# Patient Record
Sex: FEMALE | Race: WHITE | NOT HISPANIC OR LATINO | Employment: FULL TIME | URBAN - METROPOLITAN AREA
[De-identification: names, ages, dates, MRNs, and addresses within clinical notes are randomized per-mention and may not be internally consistent; named-entity substitution may affect disease eponyms.]

---

## 2017-02-16 ENCOUNTER — TELEPHONE (OUTPATIENT)
Dept: FAMILY MEDICINE | Facility: CLINIC | Age: 52
End: 2017-02-16

## 2017-02-16 NOTE — TELEPHONE ENCOUNTER
Panel Management Review      Patient has the following on her problem list: None      Composite cancer screening  Chart review shows that this patient is due/due soon for the following Mammogram and Colonoscopy  Summary:    Patient is due/failing the following:   COLONOSCOPY and MAMMOGRAM    Action needed:   Patient needs referral/order: mammo and colon    Type of outreach:    Sent [employer] message. and Sent letter.    Questions for provider review:    None                                                                                   Mary Hummel, DEMETRIO

## 2017-02-16 NOTE — LETTER
[facility]  [address] [place]  [address]  Phone: [phone]      February 16, 2017      Aimee Reff  [address]  [facility] [address]      Dear Aimee,    In order to ensure we are providing the best quality care, Dr. Argueta and I have reviewed your chart and see that you are due for a Mammogram and Colonoscopy.    Please call one of the following numbers to schedule your mammogram:  [facility] [phone]  [facility] [phone]  [facility] [phone]  [facility] [phone]    Please call one of the following numbers to schedule a colonoscopy:  [facility] [phone]  [facility] [phone]  [facility] [phone] (multiple sites, call for locations)    Colon cancer screening is recommended starting at the age of 50 for the general    population.  We have noticed that you have not yet had your colonoscopy.  We recognize that this procedure can be time consuming and sometimes uncomfortable. However, colonoscopy is the gold standard for colon cancer screening and may be performed as infrequently as every 10 years as long as the colon appears healthy.  Colonoscopy enables the physician to detect and remove precancerous polyps and identify early cancers during a single examination.   Since colorectal cancer is the second leading cause of cancer related deaths in the [place] we strongly recommend screening for this disease.    If you are reluctant to undergo a colonoscopy, there is a less invasive and less expensive alternative.  FIT (fecal immunochemical testing) is a screening option that is performed on a yearly basis.  This is a test to check for blood in the stool, which can be performed in the comfort of your own home.  When you have completed the test, you simply mail it in the pre-addressed envelope.  Please call us at [phone] if you have questions or need the FIT testing mailed to your home.    We greatly appreciate the  opportunity to serve you. Thank you for trusting us with your health care.    Sincerely,    DEMETRIO Streeter M.D.

## 2017-10-02 ENCOUNTER — HOSPITAL ENCOUNTER (OUTPATIENT)
Dept: MAMMOGRAPHY | Facility: CLINIC | Age: 52
Discharge: HOME | End: 2017-10-02
Attending: FAMILY MEDICINE | Admitting: FAMILY MEDICINE
Payer: COMMERCIAL

## 2017-10-02 DIAGNOSIS — Z12.31 VISIT FOR SCREENING MAMMOGRAM: ICD-10-CM

## 2017-10-02 PROCEDURE — G0202 SCR MAMMO BI INCL CAD: HCPCS

## 2017-10-11 ENCOUNTER — RADIANT APPOINTMENT (OUTPATIENT)
Dept: MAMMOGRAPHY | Facility: CLINIC | Age: 52
End: 2017-10-11
Attending: FAMILY MEDICINE
Payer: COMMERCIAL

## 2017-10-11 DIAGNOSIS — R92.8 ABNORMAL MAMMOGRAM: ICD-10-CM

## 2017-10-11 PROCEDURE — G0206 DX MAMMO INCL CAD UNI: HCPCS | Mod: LT

## 2017-10-24 ENCOUNTER — RADIANT APPOINTMENT (OUTPATIENT)
Dept: MAMMOGRAPHY | Facility: CLINIC | Age: 52
End: 2017-10-24
Attending: FAMILY MEDICINE
Payer: COMMERCIAL

## 2017-10-24 ENCOUNTER — RADIANT APPOINTMENT (OUTPATIENT)
Dept: CT IMAGING | Facility: CLINIC | Age: 52
End: 2017-10-24
Attending: FAMILY MEDICINE
Payer: COMMERCIAL

## 2017-10-24 DIAGNOSIS — R92.1 BREAST CALCIFICATIONS: ICD-10-CM

## 2017-10-24 DIAGNOSIS — R92.1 BREAST CALCIFICATIONS: Primary | ICD-10-CM

## 2017-10-24 PROCEDURE — 88305 TISSUE EXAM BY PATHOLOGIST: CPT | Performed by: FAMILY MEDICINE

## 2017-10-24 PROCEDURE — G0204 DX MAMMO INCL CAD BI: HCPCS | Performed by: STUDENT IN AN ORGANIZED HEALTH CARE EDUCATION/TRAINING PROGRAM

## 2017-10-24 PROCEDURE — 19081 BX BREAST 1ST LESION STRTCTC: CPT | Mod: LT | Performed by: STUDENT IN AN ORGANIZED HEALTH CARE EDUCATION/TRAINING PROGRAM

## 2017-10-24 RX ORDER — IOPAMIDOL 755 MG/ML
77 INJECTION, SOLUTION INTRAVASCULAR ONCE
Status: COMPLETED | OUTPATIENT
Start: 2017-10-24 | End: 2017-10-24

## 2017-10-24 RX ORDER — LIDOCAINE HYDROCHLORIDE AND EPINEPHRINE 10; 10 MG/ML; UG/ML
16 INJECTION, SOLUTION INFILTRATION; PERINEURAL ONCE
Status: COMPLETED | OUTPATIENT
Start: 2017-10-24 | End: 2017-10-24

## 2017-10-24 RX ADMIN — Medication 1 MEQ: at 15:18

## 2017-10-24 RX ADMIN — LIDOCAINE HYDROCHLORIDE AND EPINEPHRINE 16 ML: 10; 10 INJECTION, SOLUTION INFILTRATION; PERINEURAL at 15:21

## 2017-10-24 RX ADMIN — IOPAMIDOL 77 ML: 755 INJECTION, SOLUTION INTRAVASCULAR at 14:53

## 2017-10-27 ENCOUNTER — TELEPHONE (OUTPATIENT)
Dept: GENERAL RADIOLOGY | Facility: CLINIC | Age: 52
End: 2017-10-27

## 2017-10-27 LAB — COPATH REPORT: NORMAL

## 2017-10-27 NOTE — TELEPHONE ENCOUNTER
Spoke to Aimee regarding her benign breast biopsy results and the Radiologist's recommendation of annual mammography. Aimee verbalized understanding and all questions were addressed at this time.

## 2018-09-04 ENCOUNTER — TELEPHONE (OUTPATIENT)
Dept: FAMILY MEDICINE | Facility: CLINIC | Age: 53
End: 2018-09-04

## 2018-09-04 NOTE — TELEPHONE ENCOUNTER
Reason for call:  Patient reporting a symptom    Symptom or request: Amiee has been having flu sx., fever, chills, cough, not sleeping at night, congestion and ear pain.  She had an appointment with PA today, but wondering if she really needs to come in.  Please call and assess. Thank you..Corin Ricardo    Duration (how long have symptoms been present): unknown    Have you been treated for this before? No    Phone Number patient can be reached at:  Home number on file [phone] (home)    Best Time:  Any time    Can we leave a detailed message on this number:  YES    Call taken on 9/4/2018 at 9:03 AM by Corin Ricardo

## 2018-09-04 NOTE — TELEPHONE ENCOUNTER
"Patient does not have a thermometer so does not know what her temp is. Taking dayquil and nyquil. Coughing up phlegm--worse when laying down. Symptoms have been since 9/2/18. \"I cough so hard that I cannot catch my breath; but then I am OK.\" \"Throat hurts because of my coughing.\" \"Both ears hurt when I swallow.\" Patient denies wheezing but she is not sure if her chest is congested or not. \"I think that they might be.\" Advised cool mist vaporizor, warm steamy shower, hot tea with lemon juice and honey, chicken noodle soup, push water, rest--elevate head with pillows or sleep in recliner, nasal marely rinse. Appointment made for tomorrow in case it is needed if lungs feel congested. Patient will cancel if she feels like lungs are better.   Edvin Mtz, RN    "

## 2018-09-11 ENCOUNTER — TELEPHONE (OUTPATIENT)
Dept: FAMILY MEDICINE | Facility: CLINIC | Age: 53
End: 2018-09-11

## 2018-09-11 ENCOUNTER — OFFICE VISIT (OUTPATIENT)
Dept: FAMILY MEDICINE | Facility: CLINIC | Age: 53
End: 2018-09-11
Payer: COMMERCIAL

## 2018-09-11 VITALS
WEIGHT: 128 LBS | SYSTOLIC BLOOD PRESSURE: 129 MMHG | DIASTOLIC BLOOD PRESSURE: 82 MMHG | HEART RATE: 76 BPM | TEMPERATURE: 98.7 F | OXYGEN SATURATION: 100 % | HEIGHT: 69 IN | BODY MASS INDEX: 18.96 KG/M2

## 2018-09-11 DIAGNOSIS — J01.90 ACUTE SINUSITIS WITH SYMPTOMS > 10 DAYS: Primary | ICD-10-CM

## 2018-09-11 PROCEDURE — 99213 OFFICE O/P EST LOW 20 MIN: CPT | Performed by: PHYSICIAN ASSISTANT

## 2018-09-11 RX ORDER — DOXYCYCLINE 100 MG/1
100 CAPSULE ORAL 2 TIMES DAILY
Qty: 20 CAPSULE | Refills: 0 | Status: SHIPPED | OUTPATIENT
Start: 2018-09-11 | End: 2019-06-28

## 2018-09-11 NOTE — TELEPHONE ENCOUNTER
Reason for call:  Patient reporting a symptom    Symptom or request: Aimee called in last week with what she thought were flu sx.  9/4/18.  She is still feeling ill.  It seems like the sx are a bit better, but she is now wheezing and feels like the cold has settled into her chest.  Please call and assess. Thank you..Corin Ricardo    Duration (how long have symptoms been present): at least 10 days    Have you been treated for this before? No    Phone Number patient can be reached at:  Cell number on file:    Telephone Information:   Mobile [phone]       Best Time:  Any time    Can we leave a detailed message on this number:  YES    Call taken on 9/11/2018 at 8:24 AM by Corin Ricardo

## 2018-09-11 NOTE — PROGRESS NOTES
"  SUBJECTIVE:   Aimee Higuera is a 52 year old female who presents to clinic today for the following health issues:      ENT Symptoms             Symptoms: cc Present Absent Comment   Fever/Chills  x  Fever on Tuesday    Fatigue  x     Muscle Aches   x    Eye Irritation  x  Burning    Sneezing   x    Nasal Bonilla/Drg  x     Sinus Pressure/Pain   x    Loss of smell   x    Dental pain   x    Sore Throat   x    Swollen Glands   x    Ear Pain/Fullness  x  Pressure in the ears    Cough  x     Wheeze  x     Chest Pain   x    Shortness of breath  x  With activity    Rash   x    Other   x      Symptom duration:  10 days    Symptom severity:  moderate   Treatments tried:  Dayquil and Nyquil    Contacts:  None       Actually started over 10 days ago but assumed it was a cold or allergies  Just developed allergies this summer so does take a daily allergy medication  Last week was home from work with a fever which last until Wed  Fever broke and she thought she would get better but symptoms are persisting      Problem list and histories reviewed & adjusted, as indicated.  Additional history: as documented    Current Outpatient Prescriptions   Medication Sig Dispense Refill     Cholecalciferol (VITAMIN D PO)        Multiple Vitamins-Minerals (MULTIVITAMIN OR)        Allergies   Allergen Reactions     Amoxicillin      BP Readings from Last 3 Encounters:   09/11/18 129/82   07/20/16 98/71   06/04/15 125/78    Wt Readings from Last 3 Encounters:   09/11/18 128 lb (58.1 kg)   07/20/16 126 lb (57.2 kg)   06/04/15 126 lb (57.2 kg)                    Reviewed and updated as needed this visit by clinical staff       Reviewed and updated as needed this visit by Provider         ROS:  Remainder of ROS obtained and found to be negative other than that which was documented above      OBJECTIVE:     /82  Pulse 76  Temp 98.7  F (37.1  C) (Tympanic)  Ht 5' 8.5\" (1.74 m)  Wt 128 lb (58.1 kg)  SpO2 100%  BMI 19.18 kg/m2  Body mass index " is 19.18 kg/(m^2).  GENERAL: healthy, alert and no distress  EYES: Eyes grossly normal to inspection  HENT: ear canals and TM's normal, nose and mouth without ulcers or lesions  NECK: no adenopathy  RESP: lungs clear to auscultation - no rales, rhonchi or wheezes  CV: regular rates and rhythm, normal S1 S2, no S3 or S4 and no murmur, click or rub    Diagnostic Test Results:  none     ASSESSMENT/PLAN:     (J01.90) Acute sinusitis with symptoms > 10 days  (primary encounter diagnosis)  Comment: allergy to amoxicillin. Treat with doxy. Follow up if not improving  Plan: doxycycline (VIBRAMYCIN) 100 MG capsule                Margareth Mccain PA-C  [facility]

## 2018-09-11 NOTE — TELEPHONE ENCOUNTER
Patient reports wheezing and worsening chest congestion x3days. Afebrile the last 2 days, feels she is not getting better. Appointment made for 120pm today.  Mary LAURENT/GI

## 2019-06-28 ENCOUNTER — OFFICE VISIT (OUTPATIENT)
Dept: FAMILY MEDICINE | Facility: CLINIC | Age: 54
End: 2019-06-28
Payer: COMMERCIAL

## 2019-06-28 VITALS
SYSTOLIC BLOOD PRESSURE: 120 MMHG | WEIGHT: 130 LBS | HEIGHT: 69 IN | BODY MASS INDEX: 19.26 KG/M2 | TEMPERATURE: 98.6 F | DIASTOLIC BLOOD PRESSURE: 68 MMHG

## 2019-06-28 DIAGNOSIS — Z00.00 ROUTINE GENERAL MEDICAL EXAMINATION AT A HEALTH CARE FACILITY: Primary | ICD-10-CM

## 2019-06-28 DIAGNOSIS — Z12.11 SPECIAL SCREENING FOR MALIGNANT NEOPLASMS, COLON: ICD-10-CM

## 2019-06-28 DIAGNOSIS — L30.9 DERMATITIS: ICD-10-CM

## 2019-06-28 DIAGNOSIS — Z12.4 ENCOUNTER FOR SCREENING FOR CERVICAL CANCER: ICD-10-CM

## 2019-06-28 DIAGNOSIS — N95.1 VAGINAL DRYNESS, MENOPAUSAL: ICD-10-CM

## 2019-06-28 PROCEDURE — G0145 SCR C/V CYTO,THINLAYER,RESCR: HCPCS | Performed by: PHYSICIAN ASSISTANT

## 2019-06-28 PROCEDURE — 87624 HPV HI-RISK TYP POOLED RSLT: CPT | Performed by: PHYSICIAN ASSISTANT

## 2019-06-28 PROCEDURE — 99213 OFFICE O/P EST LOW 20 MIN: CPT | Mod: 25 | Performed by: PHYSICIAN ASSISTANT

## 2019-06-28 PROCEDURE — 99396 PREV VISIT EST AGE 40-64: CPT | Performed by: PHYSICIAN ASSISTANT

## 2019-06-28 RX ORDER — ASPIRIN 81 MG/1
81 TABLET, CHEWABLE ORAL DAILY
COMMUNITY

## 2019-06-28 RX ORDER — HYDROXYZINE HYDROCHLORIDE 25 MG/1
25-50 TABLET, FILM COATED ORAL EVERY 6 HOURS PRN
Qty: 60 TABLET | Refills: 1 | Status: SHIPPED | OUTPATIENT
Start: 2019-06-28 | End: 2021-10-27

## 2019-06-28 ASSESSMENT — MIFFLIN-ST. JEOR: SCORE: 1251.12

## 2019-06-28 ASSESSMENT — PAIN SCALES - GENERAL: PAINLEVEL: NO PAIN (0)

## 2019-06-28 NOTE — PROGRESS NOTES
SUBJECTIVE:   CC: Aimee Higuera is an 53 year old woman who presents for preventive health visit.     Healthy Habits:    Do you get at least three servings of calcium containing foods daily (dairy, green leafy vegetables, etc.)? yes    Amount of exercise or daily activities, outside of work: Walking 3 times a week     Problems taking medications regularly No    Medication side effects: No    Have you had an eye exam in the past two years? No, due in September     Do you see a dentist twice per year? yes    Do you have sleep apnea, excessive snoring or daytime drowsiness?no      -Her knees have been bothering her more often lately.   Used to be a runner but knees couldn't handle it well so now mostly walks. Can feel them on some walks. Wondering if she is making them worse  CAn be pretty stiff some mornings but not every mornings  No swelling or redness    -Wanting to get a colonoscopy order.   She has never had one  Her  had colon cancer - doing well     -Wanting to get a new order of Hydroxyzine. She got it prescribed in 2013.   Helps with her menopause symptoms    - sex has been painful. dryness    Today's PHQ-2 Score:   PHQ-2 ( 1999 [employer]) 7/20/2016 10/16/2013   Q1: Little interest or pleasure in doing things 0 0   Q2: Feeling down, depressed or hopeless 0 0   PHQ-2 Score 0 0       Abuse: Current or Past(Physical, Sexual or Emotional)- No  Do you feel safe in your environment? Yes    Social History     Tobacco Use     Smoking status: Never Smoker     Smokeless tobacco: Never Used   Substance Use Topics     Alcohol use: Yes     Comment: Socially     If you drink alcohol do you typically have >3 drinks per day or >7 drinks per week? No                     Reviewed orders with patient.  Reviewed health maintenance and updated orders accordingly - Yes  BP Readings from Last 3 Encounters:   06/28/19 120/68   09/11/18 129/82   07/20/16 98/71    Wt Readings from Last 3 Encounters:   06/28/19 59 kg (130 lb)  "  09/11/18 58.1 kg (128 lb)   07/20/16 57.2 kg (126 lb)                  Current Outpatient Medications   Medication Sig Dispense Refill     aspirin (ASA) 81 MG chewable tablet Take 81 mg by mouth daily       BIOTIN PO        Cholecalciferol (VITAMIN D PO)        Omega-3 Fatty Acids (FISH OIL PO)        Allergies   Allergen Reactions     Amoxicillin        Mammogram Screening: Patient over age 50, mutual decision to screen reflected in health maintenance.    Pertinent mammograms are reviewed under the imaging tab.  History of abnormal Pap smear: NO - age 30-65 PAP every 5 years with negative HPV co-testing recommended  PAP / HPV Latest Ref Rng & Units 7/20/2016 10/16/2013 5/25/2011   PAP - ASC-US(A) NIL NIL   HPV 16 DNA NEG Negative - -   HPV 18 DNA NEG Negative - -   OTHER HR HPV NEG Negative - -     Reviewed and updated as needed this visit by clinical staff  Tobacco  Allergies  Meds  Med Hx  Surg Hx  Fam Hx  Soc Hx        Reviewed and updated as needed this visit by Provider            ROS:  Remainder of ROS obtained and found to be negative other than that which was documented above      OBJECTIVE:   /68   Temp 98.6  F (37  C) (Tympanic)   Ht 1.74 m (5' 8.5\")   Wt 59 kg (130 lb)   BMI 19.48 kg/m    EXAM:  GENERAL: healthy, alert and no distress  EYES: Eyes grossly normal to inspection, PERRL and conjunctivae and sclerae normal  HENT: ear canals and TM's normal, nose and mouth without ulcers or lesions  NECK: no adenopathy, no asymmetry, masses, or scars and thyroid normal to palpation  RESP: lungs clear to auscultation - no rales, rhonchi or wheezes  BREAST: normal without masses, tenderness or nipple discharge and no palpable axillary masses or adenopathy  CV: regular rate and rhythm, normal S1 S2, no S3 or S4, no murmur, click or rub, no peripheral edema and peripheral pulses strong  ABDOMEN: soft, nontender, no hepatosplenomegaly, no masses and bowel sounds normal   (female): normal female " "external genitalia, normal urethral meatus, vaginal mucosa pink, moist, well rugated, and normal cervix/adnexa/uterus without masses or discharge  MS: no gross musculoskeletal defects noted, no edema  SKIN: no suspicious lesions or rashes  NEURO: Normal strength and tone, mentation intact and speech normal  PSYCH: mentation appears normal, affect normal/bright    Diagnostic Test Results:  none     ASSESSMENT/PLAN:   (Z00.00) Routine general medical examination at a health care facility  (primary encounter diagnosis)  Comment:   Plan: Pap imaged thin layer screen with HPV -         recommended age 30 - 65 years (select HPV order        below), HPV High Risk Types DNA Cervical            (Z12.11) Special screening for malignant neoplasms, colon  Comment:   Plan: GASTROENTEROLOGY ADULT REF PROCEDURE ONLY            (Z12.4) Encounter for screening for cervical cancer   Comment:   Plan: Pap imaged thin layer screen with HPV -         recommended age 30 - 65 years (select HPV order        below), HPV High Risk Types DNA Cervical            (L30.9) Dermatitis  Comment:   Plan: hydrOXYzine (ATARAX) 25 MG tablet            (N95.1) Vaginal dryness, menopausal  Comment:   Plan: conjugated estrogens (PREMARIN) 0.625 MG/GM         vaginal cream              COUNSELING:   Reviewed preventive health counseling, as reflected in patient instructions       Regular exercise       Healthy diet/nutrition       Colon cancer screening    Estimated body mass index is 19.48 kg/m  as calculated from the following:    Height as of this encounter: 1.74 m (5' 8.5\").    Weight as of this encounter: 59 kg (130 lb).         reports that she has never smoked. She has never used smokeless tobacco.      Counseling Resources:  ATP IV Guidelines  Pooled Cohorts Equation Calculator  Breast Cancer Risk Calculator  FRAX Risk Assessment  ICSI Preventive Guidelines  Dietary Guidelines for Americans, 2010  USDA's MyPlate  ASA Prophylaxis  Lung CA " Screening    Margareth Mccain PA-C  [facility]

## 2019-06-28 NOTE — PATIENT INSTRUCTIONS
To schedule the mammogram, call [phone]    To schedule the colonoscopy, call [phone]      Preventive Health Recommendations  Female Ages 50 - 64    Yearly exam: See your health care provider every year in order to  o Review health changes.   o Discuss preventive care.    o Review your medicines if your doctor has prescribed any.      Get a Pap test every three years (unless you have an abnormal result and your provider advises testing more often).    If you get Pap tests with HPV test, you only need to test every 5 years, unless you have an abnormal result.     You do not need a Pap test if your uterus was removed (hysterectomy) and you have not had cancer.    You should be tested each year for STDs (sexually transmitted diseases) if you're at risk.     Have a mammogram every 1 to 2 years.    Have a colonoscopy at age 50, or have a yearly FIT test (stool test). These exams screen for colon cancer.      Have a cholesterol test every 5 years, or more often if advised.    Have a diabetes test (fasting glucose) every three years. If you are at risk for diabetes, you should have this test more often.     If you are at risk for osteoporosis (brittle bone disease), think about having a bone density scan (DEXA).    Shots: Get a flu shot each year. Get a tetanus shot every 10 years.    Nutrition:     Eat at least 5 servings of fruits and vegetables each day.    Eat whole-grain bread, whole-wheat pasta and brown rice instead of white grains and rice.    Get adequate Calcium and Vitamin D.     Lifestyle    Exercise at least 150 minutes a week (30 minutes a day, 5 days a week). This will help you control your weight and prevent disease.    Limit alcohol to one drink per day.    No smoking.     Wear sunscreen to prevent skin cancer.     See your dentist every six months for an exam and cleaning.    See your eye doctor every 1 to 2 years.

## 2019-07-03 LAB
COPATH REPORT: NORMAL
PAP: NORMAL

## 2019-07-05 LAB
FINAL DIAGNOSIS: NORMAL
HPV HR 12 DNA CVX QL NAA+PROBE: NEGATIVE
HPV16 DNA SPEC QL NAA+PROBE: NEGATIVE
HPV18 DNA SPEC QL NAA+PROBE: NEGATIVE
SPECIMEN DESCRIPTION: NORMAL
SPECIMEN SOURCE CVX/VAG CYTO: NORMAL

## 2019-11-25 ENCOUNTER — ANESTHESIA EVENT (OUTPATIENT)
Dept: GASTROENTEROLOGY | Facility: CLINIC | Age: 54
End: 2019-11-25

## 2019-11-25 NOTE — ANESTHESIA PREPROCEDURE EVALUATION
"Anesthesia Pre-Procedure Evaluation    Patient: Aimee Higuera   MRN: [id number] : 1965          Preoperative Diagnosis: Special screening for malignant neoplasms, colon [Z12.11]    Procedure(s):  COLONOSCOPY    Past Medical History:   Diagnosis Date     ASCUS of cervix with negative high risk HPV 16 ASCUS pap/neg HR HPV.      Hx of abnormal Pap smear ~    no further details provided     Past Surgical History:   Procedure Laterality Date     LAPAROSCOPIC BIOPSY (GENERIC)  22 years old     RECONSTRUCT BREAST, IMPLANT PROSTHESIS, COMBINED  30 years old    Breast implants       Anesthesia Evaluation     .             ROS/MED HX    ENT/Pulmonary:       Neurologic:       Cardiovascular:         METS/Exercise Tolerance:     Hematologic:         Musculoskeletal:         GI/Hepatic:     (+) Other GI/Hepatic       Renal/Genitourinary:         Endo:         Psychiatric:         Infectious Disease:         Malignancy:         Other:                                 Lab Results   Component Value Date    WBC 4.7 2011    HGB 13.8 2011    HCT 42.1 2011     2011    GLC 85 2016    ALBUMIN 4.7 10/16/2013    PROTTOTAL 7.9 10/16/2013    ALT 37 10/16/2013    AST 24 10/16/2013    ALKPHOS 48 10/16/2013    BILITOTAL 0.5 10/16/2013    TSH 1.72 2016       Preop Vitals  BP Readings from Last 3 Encounters:   19 120/68   18 129/82   16 98/71    Pulse Readings from Last 3 Encounters:   18 76   16 80   06/04/15 97      Resp Readings from Last 3 Encounters:   14 12   10/16/13 18    SpO2 Readings from Last 3 Encounters:   18 100%   06/04/15 100%   14 99%      Temp Readings from Last 1 Encounters:   19 37  C (98.6  F) (Tympanic)    Ht Readings from Last 1 Encounters:   19 1.74 m (5' 8.5\")      Wt Readings from Last 1 Encounters:   19 59 kg (130 lb)    Estimated body mass index is 19.48 kg/m  as calculated from the " "following:    Height as of 6/28/19: 1.74 m (5' 8.5\").    Weight as of 6/28/19: 59 kg (130 lb).       Anesthesia Plan      History & Physical Review      ASA Status:  2 .             Postoperative Care      Consents                 KARI Yuen CRNA  "

## 2019-12-09 ENCOUNTER — HOSPITAL ENCOUNTER (OUTPATIENT)
Facility: CLINIC | Age: 54
Discharge: HOME | End: 2019-12-09
Attending: SURGERY | Admitting: SURGERY
Payer: COMMERCIAL

## 2019-12-09 ENCOUNTER — ANESTHESIA (OUTPATIENT)
Dept: GASTROENTEROLOGY | Facility: CLINIC | Age: 54
End: 2019-12-09

## 2019-12-09 VITALS
HEART RATE: 66 BPM | WEIGHT: 123 LBS | OXYGEN SATURATION: 100 % | DIASTOLIC BLOOD PRESSURE: 64 MMHG | HEIGHT: 69 IN | RESPIRATION RATE: 16 BRPM | BODY MASS INDEX: 18.22 KG/M2 | TEMPERATURE: 96.8 F | SYSTOLIC BLOOD PRESSURE: 99 MMHG

## 2019-12-09 LAB — COLONOSCOPY: NORMAL

## 2019-12-09 PROCEDURE — 99152 MOD SED SAME PHYS/QHP 5/>YRS: CPT | Mod: 59 | Performed by: SURGERY

## 2019-12-09 PROCEDURE — 88305 TISSUE EXAM BY PATHOLOGIST: CPT | Performed by: SURGERY

## 2019-12-09 PROCEDURE — 25800030 ZZH RX IP 258 OP 636: Performed by: SURGERY

## 2019-12-09 PROCEDURE — 25000128 H RX IP 250 OP 636: Performed by: SURGERY

## 2019-12-09 PROCEDURE — 45385 COLONOSCOPY W/LESION REMOVAL: CPT | Mod: PT | Performed by: SURGERY

## 2019-12-09 PROCEDURE — G0500 MOD SEDAT ENDO SERVICE >5YRS: HCPCS

## 2019-12-09 PROCEDURE — 88305 TISSUE EXAM BY PATHOLOGIST: CPT | Mod: 26 | Performed by: SURGERY

## 2019-12-09 RX ORDER — FENTANYL CITRATE 50 UG/ML
INJECTION, SOLUTION INTRAMUSCULAR; INTRAVENOUS PRN
Status: DISCONTINUED | OUTPATIENT
Start: 2019-12-09 | End: 2019-12-09 | Stop reason: HOSPADM

## 2019-12-09 RX ORDER — SODIUM CHLORIDE, SODIUM LACTATE, POTASSIUM CHLORIDE, CALCIUM CHLORIDE 600; 310; 30; 20 MG/100ML; MG/100ML; MG/100ML; MG/100ML
INJECTION, SOLUTION INTRAVENOUS CONTINUOUS
Status: DISCONTINUED | OUTPATIENT
Start: 2019-12-09 | End: 2019-12-09 | Stop reason: HOSPADM

## 2019-12-09 RX ADMIN — SODIUM CHLORIDE, POTASSIUM CHLORIDE, SODIUM LACTATE AND CALCIUM CHLORIDE: 600; 310; 30; 20 INJECTION, SOLUTION INTRAVENOUS at 07:04

## 2019-12-09 ASSESSMENT — MIFFLIN-ST. JEOR: SCORE: 1222.3

## 2019-12-09 NOTE — H&P
[facility]    Pre-Endoscopy History and Physical     Aimee Higuera MRN# [id number]   YOB: 1965 Age: 54 year old     Date of Procedure: 12/9/2019  Primary care provider: Merari Argueta  Type of Endoscopy: Colonoscopy with possible biopsy, possible polypectomy  Reason for Procedure: screening  Type of Anesthesia Anticipated: Conscious Sedation    HPI:    Aimee is a 54 year old female who will be undergoing the above procedure.  1st colonoscopy; no PMH: dx lap; mom breast cancer 62 y.o; no colon cancer    A history and physical has been performed. The patient's medications and allergies have been reviewed. The risks and benefits of the procedure and the sedation options and risks were discussed with the patient.  All questions were answered and informed consent was obtained.      She denies a personal or family history of anesthesia complications or bleeding disorders.     Patient Active Problem List   Diagnosis     IBS (irritable bowel syndrome)     CARDIOVASCULAR SCREENING; LDL GOAL LESS THAN 160     Changing skin lesion        Past Medical History:   Diagnosis Date     ASCUS of cervix with negative high risk HPV 7/20/16 7/20/16 ASCUS pap/neg HR HPV.      Hx of abnormal Pap smear ~2001    no further details provided        Past Surgical History:   Procedure Laterality Date     LAPAROSCOPIC BIOPSY (GENERIC)  22 years old     RECONSTRUCT BREAST, IMPLANT PROSTHESIS, COMBINED  30 years old    Breast implants       Social History     Tobacco Use     Smoking status: Never Smoker     Smokeless tobacco: Never Used   Substance Use Topics     Alcohol use: Yes     Comment: Socially       Family History   Problem Relation Age of Onset     Hypertension Mother      Breast Cancer Mother 62     Heart Disease Maternal Grandmother         Congestive heart failure       Prior to Admission medications    Medication Sig Start Date End Date Taking? Authorizing Provider   aspirin (ASA) 81 MG  "chewable tablet Take 81 mg by mouth daily   Yes Reported, Patient   BIOTIN PO    Yes Reported, Patient   Cholecalciferol (VITAMIN D PO)    Yes Reported, Patient   conjugated estrogens (PREMARIN) 0.625 MG/GM vaginal cream Place 0.5 g vaginally twice a week 7/1/19  Yes Margareth Mccain PA-C   hydrOXYzine (ATARAX) 25 MG tablet Take 1-2 tablets (25-50 mg) by mouth every 6 hours as needed for itching 6/28/19  Yes Margareth Mccain PA-C   Omega-3 Fatty Acids (FISH OIL PO)    Yes Reported, Patient       Allergies   Allergen Reactions     Amoxicillin         REVIEW OF SYSTEMS:   5 point ROS negative except as noted above in HPI, including Gen., Resp., CV, GI &  system review.    PHYSICAL EXAM:   BP 94/66 (BP Location: Right arm)   Pulse 73   Temp 98.2  F (36.8  C) (Oral)   Resp 16   Ht 1.753 m (5' 9\")   Wt 55.8 kg (123 lb)   LMP 11/30/2015   SpO2 100%   Breastfeeding No   BMI 18.16 kg/m   Estimated body mass index is 18.16 kg/m  as calculated from the following:    Height as of this encounter: 1.753 m (5' 9\").    Weight as of this encounter: 55.8 kg (123 lb).   Constitutional: Awake, alert, no acute distress.  Eyes: No scleral icterus.  Conjunctiva are without injection.  ENMT: Mucous membranes moist, dentition and gums are intact.   Neck: Soft, supple, trachea midline.    Endocrine: n/a   Lymphatic: There is no cervical, submandibularadenopathy.  Respiratory: Lungs are clear to auscultation and percussion bilaterally.   Cardiovascular: Regular rate and rhythm. No murmurs, rubs, or gallops.    Abdomen: Non-distended, non-tender, normoactive bowel sounds present, No masses,  Musculoskeletal: No spinal or CVA tenderness. Full range of motion in the upper and lower extremities.    Skin: No skin rashes or lesions to inspection.  No petechia.    Neurologic: Cranial nerves II through XII are grossly intact and symmetric.  Psychiatric: The patient is alert and oriented times 3.  The patient's affect " is not blunted and mood is appropriate.  DIAGNOSTICS:    Not indicated    IMPRESSION   ASA Class 2 - Mild systemic disease  Screening colonoscopy    PLAN:   Plan for Colonoscopy with possible biopsy, possible polypectomy. We discussed the risks, benefits and alternatives and the patient wished to proceed.  Patient is cleared for the above procedure.    The above has been forwarded to the consulting provider.    [facility], [facility]

## 2019-12-09 NOTE — LETTER
[address] [address]           Tel [phone]     Aimee Reff  [address] [facility] [address]        December 13, 2019    Dear Aimee,  This letter is written to inform you of the results of your recent colonoscopy.  Your examination showed polyp(s) in your rectum. All polyps were removed in their entirety and sent for review by a pathologist. As you will see on the pathology report below, the tissue(s) were hyperplastic polyps. Your examination was otherwise without abnormality.    SPECIMEN(S):   Rectal polyp     FINAL DIAGNOSIS:   Rectal polyp:   - Hyperplastic polyp.     Electronically signed out by:     CATIE Arriola M.D.     Hyperplastic polyps are entirely benign (non-cancerous) and rarely associated with the development of additional polyps or colorectal cancer.    Given these findings, I recommend that you undergo a repeat colonoscopy in ten years for screening. We will enter you into a recall system so you receive a reminder closer to the time that you are due for repeat examination.     Please remember that this recommendation is made with the understanding that you are not experiencing persistent changes in bowel function, bleeding per rectum, and/or significant abdominal pain. If you experience these symptoms, please contact your primary care provider for a further evaluation.     If you have any questions or concerns about the results of your colonoscopy or the appropriate follow-up, please contact my assistant at [phone]    Sincerely,        [facility],   [facility]  ___

## 2019-12-09 NOTE — DISCHARGE INSTRUCTIONS
MEDICAL CENTER    Home Care Following Endoscopy    Dr. Mtzh Bridges      Activity:    You have just undergone an endoscopic procedure usually performed with Monitored Anesthesia care.  Do not work or operate machinery (including a car) for at least 12 hours.      I encourage you to walk and attempt to pass this air as soon as possible.    Diet:    Return to the diet you were on before your procedure but eat lightly for the first 12-24 hours.    Drink plenty of water.    Resume any regular medications unless otherwise advised by your physician.  Please begin any new medication prescribed as a result of your procedure as directed by your physician.     If you had any biopsy or polyp removed please refrain from aspirin or aspirin products for 2 days.  If on Coumadin please restart as instructed by your physician.   Pain:    You may take Tylenol as needed for pain.  Expected Recovery:    You can expect some mild abdominal fullness and/or discomfort due to the air used to inflate your intestinal tract. It is also normal to have a mild sore throat after upper endoscopy.    Call Your Physician if You Have:    After Upper Endoscopy:  o Shoulder, back or chest pain.  o Difficulty breathing or swallowing.  o Vomiting blood.    After Colonoscopy:  o Worsening persisting abdominal pain which is worse with activity.  o Fevers (>101 degrees F), chills or shakes.  o Passage of continued blood with bowel movements.   Any questions or concerns about your recovery, please call [phone] or after hours [phone] Nurse Advice Line.    Follow-up Care:    You should receive a call or letter with your results within 2-3 week. Please call if you have not received a notification of your results.    If asked to return to clinic please make an appointment 1 week after your procedure.  Call [phone].

## 2019-12-11 LAB — COPATH REPORT: NORMAL

## 2020-02-18 ENCOUNTER — TELEPHONE (OUTPATIENT)
Dept: FAMILY MEDICINE | Facility: CLINIC | Age: 55
End: 2020-02-18

## 2020-02-18 DIAGNOSIS — Z12.31 ENCOUNTER FOR SCREENING MAMMOGRAM FOR BREAST CANCER: Primary | ICD-10-CM

## 2020-02-18 NOTE — TELEPHONE ENCOUNTER
Panel Management Review      Patient has the following on her problem list: None      Composite cancer screening  Chart review shows that this patient is due/due soon for the following Mammogram  Summary:    Patient is due/failing the following:   MAMMOGRAM    Action needed:   Patient needs referral/order: Mammogram     Type of outreach:    Sent letter.    Questions for provider review:    None                                                                                                                                    Ashutosh Martin CMA       Chart routed to self.

## 2020-02-18 NOTE — LETTER
February 18, 2020      Aimee Reff  [address] [facility] [address]        Dear Aimee,     As part of [facility]'s commitment to health and wellness we have reviewed your chart and it indicates that you are due for one or more of the following:    -- Mammogram. The last mammogram that we have on file for you was from October 2017.   Please call one of the following numbers to schedule:  ** [facility] [phone] (at [facility] once a month)  [facility] [phone]  [facility] [phone]  [facility] [phone]  [facility] [phone]  [facility] [phone]        Please try to schedule and/or complete the tests above within the next 2-4 weeks.   The number to call to schedule an appointment at [facility] is [phone].    While we work hard to maintain accurate records, it is always possible that this notice does not accurately reflect tests that you may have had. To ensure that we do not send you unnecessary notices please verify that we have accurate dates of your tests (even if these were done many years ago) or if you are seeking care at another clinic.      Sincerely,     Margareth Mccain PA-C, Ashutosh, CMA

## 2020-03-01 ENCOUNTER — HEALTH MAINTENANCE LETTER (OUTPATIENT)
Age: 55
End: 2020-03-01

## 2020-12-14 ENCOUNTER — HEALTH MAINTENANCE LETTER (OUTPATIENT)
Age: 55
End: 2020-12-14

## 2021-04-08 ENCOUNTER — IMMUNIZATION (OUTPATIENT)
Dept: FAMILY MEDICINE | Facility: CLINIC | Age: 56
End: 2021-04-08
Payer: COMMERCIAL

## 2021-04-08 PROCEDURE — 91301 PR COVID VAC MODERNA 100 MCG/0.5 ML IM: CPT

## 2021-04-08 PROCEDURE — 0011A PR COVID VAC MODERNA 100 MCG/0.5 ML IM: CPT

## 2021-05-07 ENCOUNTER — IMMUNIZATION (OUTPATIENT)
Dept: FAMILY MEDICINE | Facility: CLINIC | Age: 56
End: 2021-05-07
Attending: FAMILY MEDICINE
Payer: COMMERCIAL

## 2021-05-07 PROCEDURE — 0012A PR COVID VAC MODERNA 100 MCG/0.5 ML IM: CPT

## 2021-05-07 PROCEDURE — 91301 PR COVID VAC MODERNA 100 MCG/0.5 ML IM: CPT

## 2021-05-29 ENCOUNTER — RECORDS - HEALTHEAST (OUTPATIENT)
Dept: ADMINISTRATIVE | Facility: CLINIC | Age: 56
End: 2021-05-29

## 2021-07-21 ENCOUNTER — RECORDS - HEALTHEAST (OUTPATIENT)
Dept: ADMINISTRATIVE | Facility: CLINIC | Age: 56
End: 2021-07-21

## 2021-10-02 ENCOUNTER — HEALTH MAINTENANCE LETTER (OUTPATIENT)
Age: 56
End: 2021-10-02

## 2021-10-05 ENCOUNTER — TELEPHONE (OUTPATIENT)
Dept: FAMILY MEDICINE | Facility: CLINIC | Age: 56
End: 2021-10-05

## 2021-10-05 DIAGNOSIS — Z13.1 SCREENING FOR DIABETES MELLITUS: Primary | ICD-10-CM

## 2021-10-05 DIAGNOSIS — Z13.220 SCREENING FOR CHOLESTEROL LEVEL: ICD-10-CM

## 2021-10-05 NOTE — TELEPHONE ENCOUNTER
Reason for Call: Request for an order or referral:    Order or referral being requested: Labs    Date needed: before my next appointment    Has the patient been seen by the PCP for this problem? YES/ PT states usually has orders    Additional comments: PT has a physical on 10/27 and would like the lab orders before    Phone number Patient can be reached at:  Cell number on file:    Telephone Information:   Mobile [phone]       Best Time:  Anytime    Can we leave a detailed message on this number?  YES    Call taken on 10/5/2021 at 5:40 PM by Renae Do

## 2021-10-27 ENCOUNTER — OFFICE VISIT (OUTPATIENT)
Dept: FAMILY MEDICINE | Facility: CLINIC | Age: 56
End: 2021-10-27
Payer: COMMERCIAL

## 2021-10-27 VITALS
OXYGEN SATURATION: 98 % | SYSTOLIC BLOOD PRESSURE: 112 MMHG | WEIGHT: 130 LBS | HEIGHT: 68 IN | HEART RATE: 84 BPM | DIASTOLIC BLOOD PRESSURE: 76 MMHG | BODY MASS INDEX: 19.7 KG/M2 | TEMPERATURE: 97.4 F

## 2021-10-27 DIAGNOSIS — Z13.1 SCREENING FOR DIABETES MELLITUS: ICD-10-CM

## 2021-10-27 DIAGNOSIS — Z13.220 SCREENING FOR CHOLESTEROL LEVEL: ICD-10-CM

## 2021-10-27 DIAGNOSIS — N95.2 VAGINAL ATROPHY: ICD-10-CM

## 2021-10-27 DIAGNOSIS — Z00.00 ROUTINE GENERAL MEDICAL EXAMINATION AT A HEALTH CARE FACILITY: ICD-10-CM

## 2021-10-27 DIAGNOSIS — Z12.31 VISIT FOR SCREENING MAMMOGRAM: ICD-10-CM

## 2021-10-27 DIAGNOSIS — R10.2 PELVIC PAIN IN FEMALE: ICD-10-CM

## 2021-10-27 DIAGNOSIS — G47.00 PERSISTENT INSOMNIA: Primary | ICD-10-CM

## 2021-10-27 LAB
CHOLEST SERPL-MCNC: 237 MG/DL
FASTING STATUS PATIENT QL REPORTED: ABNORMAL
FASTING STATUS PATIENT QL REPORTED: NORMAL
GLUCOSE BLD-MCNC: 95 MG/DL (ref 70–99)
HDLC SERPL-MCNC: 84 MG/DL
LDLC SERPL CALC-MCNC: 142 MG/DL
NONHDLC SERPL-MCNC: 153 MG/DL
TRIGL SERPL-MCNC: 54 MG/DL

## 2021-10-27 PROCEDURE — 80061 LIPID PANEL: CPT | Performed by: FAMILY MEDICINE

## 2021-10-27 PROCEDURE — 99396 PREV VISIT EST AGE 40-64: CPT | Performed by: FAMILY MEDICINE

## 2021-10-27 PROCEDURE — 99214 OFFICE O/P EST MOD 30 MIN: CPT | Mod: 25 | Performed by: FAMILY MEDICINE

## 2021-10-27 PROCEDURE — 36415 COLL VENOUS BLD VENIPUNCTURE: CPT | Performed by: FAMILY MEDICINE

## 2021-10-27 PROCEDURE — 82947 ASSAY GLUCOSE BLOOD QUANT: CPT | Performed by: FAMILY MEDICINE

## 2021-10-27 RX ORDER — HYDROXYZINE HYDROCHLORIDE 10 MG/1
10-20 TABLET, FILM COATED ORAL
Qty: 60 TABLET | Refills: 1 | Status: SHIPPED | OUTPATIENT
Start: 2021-10-27

## 2021-10-27 RX ORDER — ESTRADIOL 1 MG/1
0.5 TABLET ORAL DAILY
Qty: 20 TABLET | Refills: 3 | Status: SHIPPED | OUTPATIENT
Start: 2021-10-27

## 2021-10-27 ASSESSMENT — ENCOUNTER SYMPTOMS
COUGH: 0
ARTHRALGIAS: 0
MYALGIAS: 0
DYSURIA: 0
NERVOUS/ANXIOUS: 0
WEAKNESS: 0
HEADACHES: 0
CHILLS: 0
BREAST MASS: 0
PALPITATIONS: 0
EYE PAIN: 0
SORE THROAT: 0
FREQUENCY: 0
NAUSEA: 0
CONSTIPATION: 0
HEMATOCHEZIA: 0
SHORTNESS OF BREATH: 0
PARESTHESIAS: 0
DIARRHEA: 0
ABDOMINAL PAIN: 0
FEVER: 0
DIZZINESS: 0
HEARTBURN: 0
HEMATURIA: 0
JOINT SWELLING: 0

## 2021-10-27 ASSESSMENT — MIFFLIN-ST. JEOR: SCORE: 1233.18

## 2021-10-27 NOTE — PROGRESS NOTES
SUBJECTIVE:   CC: Aimee Higuera is an 55 year old woman who presents for preventive health visit.     Patient has been advised of split billing requirements and indicates understanding: Yes  Healthy Habits:     Getting at least 3 servings of Calcium per day:  Yes    Bi-annual eye exam:  Yes    Dental care twice a year:  Yes    Sleep apnea or symptoms of sleep apnea:  None    Diet:  Regular (no restrictions)    Frequency of exercise:  2-3 days/week    Duration of exercise:  15-30 minutes    Taking medications regularly:  Yes    Medication side effects:  None    PHQ-2 Total Score: 0    Additional concerns today:  Yes    *lump in the pelvic area on the left side. 2 months ago urgency and frequency and pain in the llq   No change in bowel unit(s)/a negative  She is still having this intermittently   *Fasting since 12noon - labs are ordered   *Sleep issues - trouble staying alseep. Has tried melatonin - gives her nightmares.   25 mg vistaril too much will try 10 mg*bruising on her right leg, doesn't seem to go away. Looks normal   *will get flu shot at [facility] - free through insurance.         Today's PHQ-2 Score:   PHQ-2 ( 1999 [employer]) 10/27/2021   Q1: Little interest or pleasure in doing things 0   Q2: Feeling down, depressed or hopeless 0   PHQ-2 Score 0   Q1: Little interest or pleasure in doing things Not at all   Q2: Feeling down, depressed or hopeless Not at all   PHQ-2 Score 0       Abuse: Current or Past (Physical, Sexual or Emotional) - No  Do you feel safe in your environment? Yes    Have you ever done Advance Care Planning? (For example, a Health Directive, POLST, or a discussion with a medical provider or your loved ones about your wishes): Yes, patient states has an Advance Care Planning document and will bring a copy to the clinic.    Social History     Tobacco Use     Smoking status: Never Smoker     Smokeless tobacco: Never Used   Substance Use Topics     Alcohol use: Yes     Comment: Socially     If you  drink alcohol do you typically have >3 drinks per day or >7 drinks per week? No    Alcohol Use 10/27/2021   Prescreen: >3 drinks/day or >7 drinks/week? Not Applicable   Prescreen: >3 drinks/day or >7 drinks/week? -   No flowsheet data found.    Reviewed orders with patient.  Reviewed health maintenance and updated orders accordingly - Yes  Lab work is in process    Breast Cancer Screening:    Breast CA Risk Assessment (FHS-7) 10/27/2021   Do you have a family history of breast, colon, or ovarian cancer? No / Unknown         Mammogram Screening: Recommended mammography every 1-2 years with patient discussion and risk factor consideration  Pertinent mammograms are reviewed under the imaging tab.    History of abnormal Pap smear: NO - age 30-65 PAP every 5 years with negative HPV co-testing recommended  PAP / HPV Latest Ref Rng & Units 6/28/2019 7/20/2016 10/16/2013   PAP (Historical) - NIL ASC-US(A) NIL   HPV16 NEG:Negative Negative Negative -   HPV18 NEG:Negative Negative Negative -   HRHPV NEG:Negative Negative Negative -     Reviewed and updated as needed this visit by clinical staff  Tobacco  Allergies  Meds              Reviewed and updated as needed this visit by Provider                Past Medical History:   Diagnosis Date     ASCUS of cervix with negative high risk HPV 7/20/16 7/20/16 ASCUS pap/neg HR HPV.      Hx of abnormal Pap smear ~2001    no further details provided        Review of Systems   Constitutional: Negative for chills and fever.   HENT: Negative for congestion, ear pain, hearing loss and sore throat.    Eyes: Negative for pain and visual disturbance.   Respiratory: Negative for cough and shortness of breath.    Cardiovascular: Negative for chest pain, palpitations and peripheral edema.   Gastrointestinal: Negative for abdominal pain, constipation, diarrhea, heartburn, hematochezia and nausea.   Breasts:  Negative for tenderness, breast mass and discharge.   Genitourinary: Negative for  "dysuria, frequency, genital sores, hematuria, pelvic pain, urgency, vaginal bleeding and vaginal discharge.   Musculoskeletal: Negative for arthralgias, joint swelling and myalgias.   Skin: Negative for rash.   Neurological: Negative for dizziness, weakness, headaches and paresthesias.   Psychiatric/Behavioral: Negative for mood changes. The patient is not nervous/anxious.           OBJECTIVE:   /76   Pulse 84   Temp 97.4  F (36.3  C) (Tympanic)   Ht 1.727 m (5' 8\")   Wt 59 kg (130 lb)   LMP 11/30/2015   SpO2 98%   BMI 19.77 kg/m    Physical Exam  GENERAL APPEARANCE: healthy, alert and no distress  EYES: Eyes grossly normal to inspection, PERRL and conjunctivae and sclerae normal  HENT: ear canals and TM's normal, nose and mouth without ulcers or lesions, oropharynx clear and oral mucous membranes moist  NECK: no adenopathy, no asymmetry, masses, or scars and thyroid normal to palpation  RESP: lungs clear to auscultation - no rales, rhonchi or wheezes  BREAST: normal without masses, tenderness or nipple discharge and no palpable axillary masses or adenopathy  CV: regular rate and rhythm, normal S1 S2, no S3 or S4, no murmur, click or rub, no peripheral edema and peripheral pulses strong  ABDOMEN: soft, nontender, no hepatosplenomegaly, no masses and bowel sounds normal  ABDOMEN: point of maximal tenderness + carnett likely abdomen wall pain   (female): normal female external genitalia, normal urethral meatus, vaginal mucosal atrophy noted, normal cervix, adnexae, and uterus without masses or abnormal discharge  MS: no musculoskeletal defects are noted and gait is age appropriate without ataxia  SKIN: no suspicious lesions or rashes  NEURO: Normal strength and tone, sensory exam grossly normal, mentation intact and speech normal  PSYCH: mentation appears normal and affect normal/bright    Diagnostic Test Results:  Labs reviewed in Epic  Results for orders placed or performed in visit on 10/27/21 "   Lipid panel reflex to direct LDL Fasting     Status: Abnormal   Result Value Ref Range    Cholesterol 237 (H) <200 mg/dL    Triglycerides 54 <150 mg/dL    Direct Measure HDL 84 >=50 mg/dL    LDL Cholesterol Calculated 142 (H) <=100 mg/dL    Non HDL Cholesterol 153 (H) <130 mg/dL    Patient Fasting > 8hrs? Unknown     Narrative    Cholesterol  Desirable:  <200 mg/dL    Triglycerides  Normal:  Less than 150 mg/dL  Borderline High:  150-199 mg/dL  High:  200-499 mg/dL  Very High:  Greater than or equal to 500 mg/dL    Direct Measure HDL  Female:  Greater than or equal to 50 mg/dL   Male:  Greater than or equal to 40 mg/dL    LDL Cholesterol  Desirable:  <100mg/dL  Above Desirable:  100-129 mg/dL   Borderline High:  130-159 mg/dL   High:  160-189 mg/dL   Very High:  >= 190 mg/dL    Non HDL Cholesterol  Desirable:  130 mg/dL  Above Desirable:  130-159 mg/dL  Borderline High:  160-189 mg/dL  High:  190-219 mg/dL  Very High:  Greater than or equal to 220 mg/dL   **Glucose FUTURE 2mo     Status: None   Result Value Ref Range    Glucose 95 70 - 99 mg/dL    Patient Fasting > 8hrs? Unknown        ASSESSMENT/PLAN:   (G47.00) Persistent insomnia  (primary encounter diagnosis)  Comment:   Plan: hydrOXYzine (ATARAX) 10 MG tablet        Will try lower dose     (Z00.00) Routine general medical examination at a health care facility  Comment:   Plan:     (Z12.31) Visit for screening mammogram  Comment:   Plan: *MA Screening Digital Bilateral            (N95.2) Vaginal atrophy  Comment:   Plan: estradiol (ESTRACE) 1 MG tablet        Try this the premarin is too messy give it at least 3 months     (R10.2) Pelvic pain in female  Comment:   Plan: US Pelvic Complete with Transvaginal        If pain changes get ultrasound otherwise appears to be abdomen wall     (Z13.220) Screening for cholesterol level  Comment:   Plan:     (Z13.1) Screening for diabetes mellitus  Comment:   Plan:     Patient has been advised of split billing  "requirements and indicates understanding: Yes  COUNSELING:  Reviewed preventive health counseling, as reflected in patient instructions    Estimated body mass index is 19.77 kg/m  as calculated from the following:    Height as of this encounter: 1.727 m (5' 8\").    Weight as of this encounter: 59 kg (130 lb).        She reports that she has never smoked. She has never used smokeless tobacco.      Counseling Resources:  ATP IV Guidelines  Pooled Cohorts Equation Calculator  Breast Cancer Risk Calculator  BRCA-Related Cancer Risk Assessment: FHS-7 Tool  FRAX Risk Assessment  ICSI Preventive Guidelines  Dietary Guidelines for Americans, 2010  USDA's MyPlate  ASA Prophylaxis  Lung CA Screening    Merari Argueta MD  [facility]  "

## 2021-10-28 NOTE — RESULT ENCOUNTER NOTE
Aimee,  Your lab results were normal/stable. Please feel free to my chart or call the office with questions. Merari Argueta M.D.

## 2022-03-19 ENCOUNTER — HEALTH MAINTENANCE LETTER (OUTPATIENT)
Age: 57
End: 2022-03-19

## 2022-05-05 ENCOUNTER — TELEPHONE (OUTPATIENT)
Dept: FAMILY MEDICINE | Facility: CLINIC | Age: 57
End: 2022-05-05

## 2022-05-05 NOTE — TELEPHONE ENCOUNTER
Patient Quality Outreach      Summary:    Patient has the following on her problem list/HM: None    Patient is due/failing the following:   Breast Cancer Screening - Mammogram    Type of outreach:    Sent [employer] message.    Questions for provider review:    None                                                                                                                                     Mary Hummel CMA     Chart routed to none.

## 2022-09-03 ENCOUNTER — HEALTH MAINTENANCE LETTER (OUTPATIENT)
Age: 57
End: 2022-09-03

## 2023-01-14 ENCOUNTER — HEALTH MAINTENANCE LETTER (OUTPATIENT)
Age: 58
End: 2023-01-14

## 2023-08-30 ENCOUNTER — TRANSFERRED RECORDS (OUTPATIENT)
Dept: HEALTH INFORMATION MANAGEMENT | Facility: CLINIC | Age: 58
End: 2023-08-30
Payer: COMMERCIAL

## 2024-02-17 ENCOUNTER — HEALTH MAINTENANCE LETTER (OUTPATIENT)
Age: 59
End: 2024-02-17

## 2024-04-27 ENCOUNTER — HEALTH MAINTENANCE LETTER (OUTPATIENT)
Age: 59
End: 2024-04-27

## 2024-10-29 ENCOUNTER — TRANSFERRED RECORDS (OUTPATIENT)
Dept: HEALTH INFORMATION MANAGEMENT | Facility: CLINIC | Age: 59
End: 2024-10-29
Payer: COMMERCIAL

## (undated) RX ORDER — FENTANYL CITRATE 50 UG/ML
INJECTION, SOLUTION INTRAMUSCULAR; INTRAVENOUS
Status: DISPENSED
Start: 2019-12-09